# Patient Record
Sex: FEMALE | Race: WHITE | NOT HISPANIC OR LATINO | Employment: UNEMPLOYED
[De-identification: names, ages, dates, MRNs, and addresses within clinical notes are randomized per-mention and may not be internally consistent; named-entity substitution may affect disease eponyms.]

---
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
## 2023-06-04 ENCOUNTER — APPOINTMENT (OUTPATIENT)
Dept: GENERAL RADIOLOGY | Facility: HOSPITAL | Age: 23
End: 2023-06-04
Attending: EMERGENCY MEDICINE
Payer: MEDICAID

## 2023-06-04 ENCOUNTER — APPOINTMENT (OUTPATIENT)
Dept: CT IMAGING | Facility: HOSPITAL | Age: 23
End: 2023-06-04
Attending: EMERGENCY MEDICINE
Payer: MEDICAID

## 2023-06-04 ENCOUNTER — APPOINTMENT (OUTPATIENT)
Dept: ULTRASOUND IMAGING | Facility: HOSPITAL | Age: 23
End: 2023-06-04
Attending: EMERGENCY MEDICINE
Payer: MEDICAID

## 2023-06-04 ENCOUNTER — HOSPITAL ENCOUNTER (INPATIENT)
Facility: HOSPITAL | Age: 23
LOS: 2 days | Discharge: HOME | End: 2023-06-06
Attending: EMERGENCY MEDICINE | Admitting: STUDENT IN AN ORGANIZED HEALTH CARE EDUCATION/TRAINING PROGRAM
Payer: MEDICAID

## 2023-06-04 DIAGNOSIS — K81.0 ACUTE CHOLECYSTITIS: Primary | ICD-10-CM

## 2023-06-04 LAB
ALBUMIN SERPL-MCNC: 4.7 G/DL (ref 3.4–5)
ALBUMIN/GLOB SERPL: 1.3 {RATIO} (ref 1–2)
ALP LIVER SERPL-CCNC: 41 U/L
ALT SERPL-CCNC: 15 U/L
ANION GAP SERPL CALC-SCNC: 7 MMOL/L (ref 0–18)
AST SERPL-CCNC: 47 U/L (ref 15–37)
ATRIAL RATE: 85 BPM
B-HCG UR QL: NEGATIVE
BASOPHILS # BLD AUTO: 0.03 X10(3) UL (ref 0–0.2)
BASOPHILS NFR BLD AUTO: 0.3 %
BILIRUB SERPL-MCNC: 0.4 MG/DL (ref 0.1–2)
BILIRUB UR QL STRIP.AUTO: NEGATIVE
BUN BLD-MCNC: 11 MG/DL (ref 7–18)
CALCIUM BLD-MCNC: 10 MG/DL (ref 8.5–10.1)
CHLORIDE SERPL-SCNC: 106 MMOL/L (ref 98–112)
CLARITY UR REFRACT.AUTO: CLEAR
CO2 SERPL-SCNC: 24 MMOL/L (ref 21–32)
COLOR UR AUTO: YELLOW
CREAT BLD-MCNC: 0.86 MG/DL
D DIMER PPP FEU-MCNC: <0.27 UG/ML FEU (ref ?–0.5)
EOSINOPHIL # BLD AUTO: 0.05 X10(3) UL (ref 0–0.7)
EOSINOPHIL NFR BLD AUTO: 0.4 %
ERYTHROCYTE [DISTWIDTH] IN BLOOD BY AUTOMATED COUNT: 12.5 %
GFR SERPLBLD BASED ON 1.73 SQ M-ARVRAT: 99 ML/MIN/1.73M2 (ref 60–?)
GLOBULIN PLAS-MCNC: 3.6 G/DL (ref 2.8–4.4)
GLUCOSE BLD-MCNC: 118 MG/DL (ref 70–99)
GLUCOSE UR STRIP.AUTO-MCNC: NEGATIVE MG/DL
HCT VFR BLD AUTO: 36.8 %
HGB BLD-MCNC: 12.8 G/DL
IMM GRANULOCYTES # BLD AUTO: 0.05 X10(3) UL (ref 0–1)
IMM GRANULOCYTES NFR BLD: 0.4 %
KETONES UR STRIP.AUTO-MCNC: 20 MG/DL
LIPASE SERPL-CCNC: 48 U/L (ref 13–75)
LYMPHOCYTES # BLD AUTO: 1.14 X10(3) UL (ref 1–4)
LYMPHOCYTES NFR BLD AUTO: 9.6 %
MCH RBC QN AUTO: 29.7 PG (ref 26–34)
MCHC RBC AUTO-ENTMCNC: 34.8 G/DL (ref 31–37)
MCV RBC AUTO: 85.4 FL
MONOCYTES # BLD AUTO: 0.42 X10(3) UL (ref 0.1–1)
MONOCYTES NFR BLD AUTO: 3.5 %
NEUTROPHILS # BLD AUTO: 10.17 X10 (3) UL (ref 1.5–7.7)
NEUTROPHILS # BLD AUTO: 10.17 X10(3) UL (ref 1.5–7.7)
NEUTROPHILS NFR BLD AUTO: 85.8 %
NITRITE UR QL STRIP.AUTO: NEGATIVE
OSMOLALITY SERPL CALC.SUM OF ELEC: 284 MOSM/KG (ref 275–295)
P AXIS: 66 DEGREES
P-R INTERVAL: 122 MS
PH UR STRIP.AUTO: 7 [PH] (ref 5–8)
PLATELET # BLD AUTO: 267 10(3)UL (ref 150–450)
POTASSIUM SERPL-SCNC: 3.4 MMOL/L (ref 3.5–5.1)
PROT SERPL-MCNC: 8.3 G/DL (ref 6.4–8.2)
PROT UR STRIP.AUTO-MCNC: NEGATIVE MG/DL
Q-T INTERVAL: 356 MS
QRS DURATION: 92 MS
QTC CALCULATION (BEZET): 423 MS
R AXIS: 71 DEGREES
RBC # BLD AUTO: 4.31 X10(6)UL
SODIUM SERPL-SCNC: 137 MMOL/L (ref 136–145)
SP GR UR STRIP.AUTO: 1.01 (ref 1–1.03)
T AXIS: 18 DEGREES
TROPONIN I HIGH SENSITIVITY: <3 NG/L
UROBILINOGEN UR STRIP.AUTO-MCNC: <2 MG/DL
VENTRICULAR RATE: 85 BPM
WBC # BLD AUTO: 11.9 X10(3) UL (ref 4–11)

## 2023-06-04 PROCEDURE — 74177 CT ABD & PELVIS W/CONTRAST: CPT | Performed by: EMERGENCY MEDICINE

## 2023-06-04 PROCEDURE — 71045 X-RAY EXAM CHEST 1 VIEW: CPT | Performed by: EMERGENCY MEDICINE

## 2023-06-04 PROCEDURE — 76700 US EXAM ABDOM COMPLETE: CPT | Performed by: EMERGENCY MEDICINE

## 2023-06-04 PROCEDURE — 99222 1ST HOSP IP/OBS MODERATE 55: CPT | Performed by: STUDENT IN AN ORGANIZED HEALTH CARE EDUCATION/TRAINING PROGRAM

## 2023-06-04 RX ORDER — DIPHENHYDRAMINE HYDROCHLORIDE 50 MG/ML
12.5 INJECTION INTRAMUSCULAR; INTRAVENOUS EVERY 4 HOURS PRN
Status: DISCONTINUED | OUTPATIENT
Start: 2023-06-04 | End: 2023-06-06

## 2023-06-04 RX ORDER — KETOROLAC TROMETHAMINE 15 MG/ML
15 INJECTION, SOLUTION INTRAMUSCULAR; INTRAVENOUS ONCE
Status: COMPLETED | OUTPATIENT
Start: 2023-06-04 | End: 2023-06-04

## 2023-06-04 RX ORDER — HYDROCODONE BITARTRATE AND ACETAMINOPHEN 5; 325 MG/1; MG/1
1 TABLET ORAL EVERY 4 HOURS PRN
Status: DISCONTINUED | OUTPATIENT
Start: 2023-06-04 | End: 2023-06-06

## 2023-06-04 RX ORDER — SODIUM CHLORIDE 9 MG/ML
100 INJECTION, SOLUTION INTRAVENOUS CONTINUOUS
Status: DISCONTINUED | OUTPATIENT
Start: 2023-06-04 | End: 2023-06-06

## 2023-06-04 RX ORDER — SODIUM CHLORIDE, SODIUM LACTATE, POTASSIUM CHLORIDE, CALCIUM CHLORIDE 600; 310; 30; 20 MG/100ML; MG/100ML; MG/100ML; MG/100ML
INJECTION, SOLUTION INTRAVENOUS CONTINUOUS
Status: DISCONTINUED | OUTPATIENT
Start: 2023-06-04 | End: 2023-06-05

## 2023-06-04 RX ORDER — POLYETHYLENE GLYCOL 3350 17 G/17G
17 POWDER, FOR SOLUTION ORAL DAILY PRN
Status: DISCONTINUED | OUTPATIENT
Start: 2023-06-04 | End: 2023-06-06

## 2023-06-04 RX ORDER — ENEMA 19; 7 G/133ML; G/133ML
1 ENEMA RECTAL ONCE AS NEEDED
Status: DISCONTINUED | OUTPATIENT
Start: 2023-06-04 | End: 2023-06-06

## 2023-06-04 RX ORDER — SENNOSIDES 8.6 MG
17.2 TABLET ORAL NIGHTLY PRN
Status: DISCONTINUED | OUTPATIENT
Start: 2023-06-04 | End: 2023-06-06

## 2023-06-04 RX ORDER — HYDROMORPHONE HYDROCHLORIDE 1 MG/ML
0.4 INJECTION, SOLUTION INTRAMUSCULAR; INTRAVENOUS; SUBCUTANEOUS EVERY 2 HOUR PRN
Status: DISCONTINUED | OUTPATIENT
Start: 2023-06-04 | End: 2023-06-06

## 2023-06-04 RX ORDER — ENOXAPARIN SODIUM 100 MG/ML
40 INJECTION SUBCUTANEOUS NIGHTLY
Status: DISCONTINUED | OUTPATIENT
Start: 2023-06-04 | End: 2023-06-06

## 2023-06-04 RX ORDER — PROCHLORPERAZINE EDISYLATE 5 MG/ML
5 INJECTION INTRAMUSCULAR; INTRAVENOUS EVERY 8 HOURS PRN
Status: DISCONTINUED | OUTPATIENT
Start: 2023-06-04 | End: 2023-06-06

## 2023-06-04 RX ORDER — HYDROMORPHONE HYDROCHLORIDE 1 MG/ML
0.2 INJECTION, SOLUTION INTRAMUSCULAR; INTRAVENOUS; SUBCUTANEOUS EVERY 2 HOUR PRN
Status: DISCONTINUED | OUTPATIENT
Start: 2023-06-04 | End: 2023-06-06

## 2023-06-04 RX ORDER — KETOROLAC TROMETHAMINE 15 MG/ML
15 INJECTION, SOLUTION INTRAMUSCULAR; INTRAVENOUS EVERY 6 HOURS PRN
Status: DISCONTINUED | OUTPATIENT
Start: 2023-06-04 | End: 2023-06-06

## 2023-06-04 RX ORDER — IBUPROFEN 200 MG
400 TABLET ORAL AS NEEDED
COMMUNITY

## 2023-06-04 RX ORDER — ACETAMINOPHEN 325 MG/1
650 TABLET ORAL EVERY 4 HOURS PRN
Status: DISCONTINUED | OUTPATIENT
Start: 2023-06-04 | End: 2023-06-06

## 2023-06-04 RX ORDER — ACETAMINOPHEN 10 MG/ML
1000 INJECTION, SOLUTION INTRAVENOUS EVERY 6 HOURS PRN
Status: DISCONTINUED | OUTPATIENT
Start: 2023-06-04 | End: 2023-06-06

## 2023-06-04 RX ORDER — HYDROCODONE BITARTRATE AND ACETAMINOPHEN 5; 325 MG/1; MG/1
2 TABLET ORAL EVERY 4 HOURS PRN
Status: DISCONTINUED | OUTPATIENT
Start: 2023-06-04 | End: 2023-06-06

## 2023-06-04 RX ORDER — BISACODYL 10 MG
10 SUPPOSITORY, RECTAL RECTAL
Status: DISCONTINUED | OUTPATIENT
Start: 2023-06-04 | End: 2023-06-06

## 2023-06-04 RX ORDER — DIPHENHYDRAMINE HCL 25 MG
25 CAPSULE ORAL EVERY 4 HOURS PRN
Status: DISCONTINUED | OUTPATIENT
Start: 2023-06-04 | End: 2023-06-06

## 2023-06-04 RX ORDER — HYDROMORPHONE HYDROCHLORIDE 1 MG/ML
0.8 INJECTION, SOLUTION INTRAMUSCULAR; INTRAVENOUS; SUBCUTANEOUS EVERY 2 HOUR PRN
Status: DISCONTINUED | OUTPATIENT
Start: 2023-06-04 | End: 2023-06-06

## 2023-06-04 RX ORDER — ONDANSETRON 2 MG/ML
4 INJECTION INTRAMUSCULAR; INTRAVENOUS EVERY 6 HOURS PRN
Status: DISCONTINUED | OUTPATIENT
Start: 2023-06-04 | End: 2023-06-06

## 2023-06-04 RX ORDER — SODIUM CHLORIDE 9 MG/ML
INJECTION, SOLUTION INTRAVENOUS CONTINUOUS
Status: ACTIVE | OUTPATIENT
Start: 2023-06-04 | End: 2023-06-04

## 2023-06-04 NOTE — ED INITIAL ASSESSMENT (HPI)
Pt here due to dizziness and light headed. Pt states that she is having chest pain and back pain for the past couple weeks. Pt states that this comes and goes.

## 2023-06-04 NOTE — PROGRESS NOTES
NURSING ADMISSION NOTE      Patient admitted via Cart  Oriented to room. Safety precautions initiated. Bed in low position. Call light in reach. A&O x4. Denies any BRITNEY or calf pain at present. Lungs clear bilaterally. Pt reports burning chest pain 3/10, denies any need for pain medication at this time. Abdomen soft, tender, nondistended. Bowel sounds active, pt denies any nausea at this time. NPO. DTV. POC discussed and pt verbalizes understanding. Pt resting in bed, call light within reach, safety precautions in place.

## 2023-06-05 ENCOUNTER — ANESTHESIA (OUTPATIENT)
Dept: SURGERY | Facility: HOSPITAL | Age: 23
End: 2023-06-05
Payer: MEDICAID

## 2023-06-05 ENCOUNTER — APPOINTMENT (OUTPATIENT)
Dept: GENERAL RADIOLOGY | Facility: HOSPITAL | Age: 23
End: 2023-06-05
Attending: STUDENT IN AN ORGANIZED HEALTH CARE EDUCATION/TRAINING PROGRAM
Payer: MEDICAID

## 2023-06-05 ENCOUNTER — ANESTHESIA EVENT (OUTPATIENT)
Dept: SURGERY | Facility: HOSPITAL | Age: 23
End: 2023-06-05
Payer: MEDICAID

## 2023-06-05 LAB
ALBUMIN SERPL-MCNC: 3.4 G/DL (ref 3.4–5)
ALBUMIN/GLOB SERPL: 1.2 {RATIO} (ref 1–2)
ALP LIVER SERPL-CCNC: 28 U/L
ALT SERPL-CCNC: 12 U/L
ANION GAP SERPL CALC-SCNC: 1 MMOL/L (ref 0–18)
AST SERPL-CCNC: 36 U/L (ref 15–37)
BILIRUB SERPL-MCNC: 0.6 MG/DL (ref 0.1–2)
BUN BLD-MCNC: 8 MG/DL (ref 7–18)
CALCIUM BLD-MCNC: 8.8 MG/DL (ref 8.5–10.1)
CHLORIDE SERPL-SCNC: 111 MMOL/L (ref 98–112)
CO2 SERPL-SCNC: 27 MMOL/L (ref 21–32)
CREAT BLD-MCNC: 0.6 MG/DL
ERYTHROCYTE [DISTWIDTH] IN BLOOD BY AUTOMATED COUNT: 12.9 %
GFR SERPLBLD BASED ON 1.73 SQ M-ARVRAT: 131 ML/MIN/1.73M2 (ref 60–?)
GLOBULIN PLAS-MCNC: 2.8 G/DL (ref 2.8–4.4)
GLUCOSE BLD-MCNC: 89 MG/DL (ref 70–99)
HCT VFR BLD AUTO: 32.1 %
HGB BLD-MCNC: 10.6 G/DL
MCH RBC QN AUTO: 29.9 PG (ref 26–34)
MCHC RBC AUTO-ENTMCNC: 33 G/DL (ref 31–37)
MCV RBC AUTO: 90.7 FL
OSMOLALITY SERPL CALC.SUM OF ELEC: 286 MOSM/KG (ref 275–295)
PLATELET # BLD AUTO: 221 10(3)UL (ref 150–450)
POTASSIUM SERPL-SCNC: 3.7 MMOL/L (ref 3.5–5.1)
PROT SERPL-MCNC: 6.2 G/DL (ref 6.4–8.2)
RBC # BLD AUTO: 3.54 X10(6)UL
SODIUM SERPL-SCNC: 139 MMOL/L (ref 136–145)
WBC # BLD AUTO: 6.1 X10(3) UL (ref 4–11)

## 2023-06-05 PROCEDURE — 47563 LAPARO CHOLECYSTECTOMY/GRAPH: CPT | Performed by: STUDENT IN AN ORGANIZED HEALTH CARE EDUCATION/TRAINING PROGRAM

## 2023-06-05 PROCEDURE — 74300 X-RAY BILE DUCTS/PANCREAS: CPT | Performed by: STUDENT IN AN ORGANIZED HEALTH CARE EDUCATION/TRAINING PROGRAM

## 2023-06-05 PROCEDURE — 0FT44ZZ RESECTION OF GALLBLADDER, PERCUTANEOUS ENDOSCOPIC APPROACH: ICD-10-PCS | Performed by: STUDENT IN AN ORGANIZED HEALTH CARE EDUCATION/TRAINING PROGRAM

## 2023-06-05 PROCEDURE — BF101ZZ FLUOROSCOPY OF BILE DUCTS USING LOW OSMOLAR CONTRAST: ICD-10-PCS | Performed by: STUDENT IN AN ORGANIZED HEALTH CARE EDUCATION/TRAINING PROGRAM

## 2023-06-05 RX ORDER — SODIUM CHLORIDE, SODIUM LACTATE, POTASSIUM CHLORIDE, CALCIUM CHLORIDE 600; 310; 30; 20 MG/100ML; MG/100ML; MG/100ML; MG/100ML
INJECTION, SOLUTION INTRAVENOUS CONTINUOUS
Status: DISCONTINUED | OUTPATIENT
Start: 2023-06-05 | End: 2023-06-05 | Stop reason: HOSPADM

## 2023-06-05 RX ORDER — GLYCOPYRROLATE 0.2 MG/ML
INJECTION, SOLUTION INTRAMUSCULAR; INTRAVENOUS AS NEEDED
Status: DISCONTINUED | OUTPATIENT
Start: 2023-06-05 | End: 2023-06-05 | Stop reason: SURG

## 2023-06-05 RX ORDER — DEXAMETHASONE SODIUM PHOSPHATE 4 MG/ML
VIAL (ML) INJECTION AS NEEDED
Status: DISCONTINUED | OUTPATIENT
Start: 2023-06-05 | End: 2023-06-05 | Stop reason: SURG

## 2023-06-05 RX ORDER — PHENYLEPHRINE HCL 10 MG/ML
VIAL (ML) INJECTION AS NEEDED
Status: DISCONTINUED | OUTPATIENT
Start: 2023-06-05 | End: 2023-06-05 | Stop reason: SURG

## 2023-06-05 RX ORDER — SODIUM CHLORIDE, SODIUM LACTATE, POTASSIUM CHLORIDE, CALCIUM CHLORIDE 600; 310; 30; 20 MG/100ML; MG/100ML; MG/100ML; MG/100ML
INJECTION, SOLUTION INTRAVENOUS CONTINUOUS PRN
Status: DISCONTINUED | OUTPATIENT
Start: 2023-06-05 | End: 2023-06-05 | Stop reason: SURG

## 2023-06-05 RX ORDER — HYDROCODONE BITARTRATE AND ACETAMINOPHEN 5; 325 MG/1; MG/1
1 TABLET ORAL ONCE AS NEEDED
Status: DISCONTINUED | OUTPATIENT
Start: 2023-06-05 | End: 2023-06-05 | Stop reason: HOSPADM

## 2023-06-05 RX ORDER — HYDROMORPHONE HYDROCHLORIDE 1 MG/ML
0.6 INJECTION, SOLUTION INTRAMUSCULAR; INTRAVENOUS; SUBCUTANEOUS EVERY 5 MIN PRN
Status: DISCONTINUED | OUTPATIENT
Start: 2023-06-05 | End: 2023-06-05 | Stop reason: HOSPADM

## 2023-06-05 RX ORDER — MIDAZOLAM HYDROCHLORIDE 1 MG/ML
INJECTION INTRAMUSCULAR; INTRAVENOUS AS NEEDED
Status: DISCONTINUED | OUTPATIENT
Start: 2023-06-05 | End: 2023-06-05 | Stop reason: SURG

## 2023-06-05 RX ORDER — NEOSTIGMINE METHYLSULFATE 1 MG/ML
INJECTION, SOLUTION INTRAVENOUS AS NEEDED
Status: DISCONTINUED | OUTPATIENT
Start: 2023-06-05 | End: 2023-06-05 | Stop reason: SURG

## 2023-06-05 RX ORDER — KETOROLAC TROMETHAMINE 30 MG/ML
INJECTION, SOLUTION INTRAMUSCULAR; INTRAVENOUS AS NEEDED
Status: DISCONTINUED | OUTPATIENT
Start: 2023-06-05 | End: 2023-06-05 | Stop reason: SURG

## 2023-06-05 RX ORDER — ACETAMINOPHEN 500 MG
1000 TABLET ORAL ONCE AS NEEDED
Status: DISCONTINUED | OUTPATIENT
Start: 2023-06-05 | End: 2023-06-05 | Stop reason: HOSPADM

## 2023-06-05 RX ORDER — METOCLOPRAMIDE HYDROCHLORIDE 5 MG/ML
INJECTION INTRAMUSCULAR; INTRAVENOUS AS NEEDED
Status: DISCONTINUED | OUTPATIENT
Start: 2023-06-05 | End: 2023-06-05 | Stop reason: SURG

## 2023-06-05 RX ORDER — NALOXONE HYDROCHLORIDE 0.4 MG/ML
80 INJECTION, SOLUTION INTRAMUSCULAR; INTRAVENOUS; SUBCUTANEOUS AS NEEDED
Status: DISCONTINUED | OUTPATIENT
Start: 2023-06-05 | End: 2023-06-05 | Stop reason: HOSPADM

## 2023-06-05 RX ORDER — PROCHLORPERAZINE EDISYLATE 5 MG/ML
5 INJECTION INTRAMUSCULAR; INTRAVENOUS EVERY 8 HOURS PRN
Status: DISCONTINUED | OUTPATIENT
Start: 2023-06-05 | End: 2023-06-05 | Stop reason: HOSPADM

## 2023-06-05 RX ORDER — HYDROMORPHONE HYDROCHLORIDE 1 MG/ML
0.2 INJECTION, SOLUTION INTRAMUSCULAR; INTRAVENOUS; SUBCUTANEOUS EVERY 5 MIN PRN
Status: DISCONTINUED | OUTPATIENT
Start: 2023-06-05 | End: 2023-06-05 | Stop reason: HOSPADM

## 2023-06-05 RX ORDER — ONDANSETRON 2 MG/ML
INJECTION INTRAMUSCULAR; INTRAVENOUS AS NEEDED
Status: DISCONTINUED | OUTPATIENT
Start: 2023-06-05 | End: 2023-06-05 | Stop reason: SURG

## 2023-06-05 RX ORDER — ROCURONIUM BROMIDE 10 MG/ML
INJECTION, SOLUTION INTRAVENOUS AS NEEDED
Status: DISCONTINUED | OUTPATIENT
Start: 2023-06-05 | End: 2023-06-05 | Stop reason: SURG

## 2023-06-05 RX ORDER — MIDAZOLAM HYDROCHLORIDE 1 MG/ML
1 INJECTION INTRAMUSCULAR; INTRAVENOUS EVERY 5 MIN PRN
Status: DISCONTINUED | OUTPATIENT
Start: 2023-06-05 | End: 2023-06-05 | Stop reason: HOSPADM

## 2023-06-05 RX ORDER — HYDROCODONE BITARTRATE AND ACETAMINOPHEN 5; 325 MG/1; MG/1
2 TABLET ORAL ONCE AS NEEDED
Status: DISCONTINUED | OUTPATIENT
Start: 2023-06-05 | End: 2023-06-05 | Stop reason: HOSPADM

## 2023-06-05 RX ORDER — BUPIVACAINE HYDROCHLORIDE 2.5 MG/ML
INJECTION, SOLUTION EPIDURAL; INFILTRATION; INTRACAUDAL AS NEEDED
Status: DISCONTINUED | OUTPATIENT
Start: 2023-06-05 | End: 2023-06-05

## 2023-06-05 RX ORDER — ONDANSETRON 2 MG/ML
4 INJECTION INTRAMUSCULAR; INTRAVENOUS EVERY 6 HOURS PRN
Status: DISCONTINUED | OUTPATIENT
Start: 2023-06-05 | End: 2023-06-05 | Stop reason: HOSPADM

## 2023-06-05 RX ORDER — HYDROMORPHONE HYDROCHLORIDE 1 MG/ML
0.4 INJECTION, SOLUTION INTRAMUSCULAR; INTRAVENOUS; SUBCUTANEOUS EVERY 5 MIN PRN
Status: DISCONTINUED | OUTPATIENT
Start: 2023-06-05 | End: 2023-06-05 | Stop reason: HOSPADM

## 2023-06-05 RX ORDER — MIDAZOLAM HYDROCHLORIDE 1 MG/ML
INJECTION INTRAMUSCULAR; INTRAVENOUS
Status: COMPLETED
Start: 2023-06-05 | End: 2023-06-05

## 2023-06-05 RX ORDER — CEFOXITIN 1 G/1
INJECTION, POWDER, FOR SOLUTION INTRAVENOUS AS NEEDED
Status: DISCONTINUED | OUTPATIENT
Start: 2023-06-05 | End: 2023-06-05 | Stop reason: SURG

## 2023-06-05 RX ORDER — HYDROCODONE BITARTRATE AND ACETAMINOPHEN 5; 325 MG/1; MG/1
1 TABLET ORAL EVERY 6 HOURS PRN
Qty: 15 TABLET | Refills: 0 | Status: SHIPPED | OUTPATIENT
Start: 2023-06-05

## 2023-06-05 RX ORDER — LIDOCAINE HYDROCHLORIDE 10 MG/ML
INJECTION, SOLUTION EPIDURAL; INFILTRATION; INTRACAUDAL; PERINEURAL AS NEEDED
Status: DISCONTINUED | OUTPATIENT
Start: 2023-06-05 | End: 2023-06-05 | Stop reason: SURG

## 2023-06-05 RX ADMIN — METOCLOPRAMIDE HYDROCHLORIDE 10 MG: 5 INJECTION INTRAMUSCULAR; INTRAVENOUS at 21:36:00

## 2023-06-05 RX ADMIN — CEFOXITIN 2 G: 1 INJECTION, POWDER, FOR SOLUTION INTRAVENOUS at 21:33:00

## 2023-06-05 RX ADMIN — NEOSTIGMINE METHYLSULFATE 3.5 MG: 1 INJECTION, SOLUTION INTRAVENOUS at 22:45:00

## 2023-06-05 RX ADMIN — KETOROLAC TROMETHAMINE 30 MG: 30 INJECTION, SOLUTION INTRAMUSCULAR; INTRAVENOUS at 22:36:00

## 2023-06-05 RX ADMIN — SODIUM CHLORIDE, SODIUM LACTATE, POTASSIUM CHLORIDE, CALCIUM CHLORIDE: 600; 310; 30; 20 INJECTION, SOLUTION INTRAVENOUS at 22:42:00

## 2023-06-05 RX ADMIN — LIDOCAINE HYDROCHLORIDE 50 MG: 10 INJECTION, SOLUTION EPIDURAL; INFILTRATION; INTRACAUDAL; PERINEURAL at 21:17:00

## 2023-06-05 RX ADMIN — ROCURONIUM BROMIDE 50 MG: 10 INJECTION, SOLUTION INTRAVENOUS at 21:17:00

## 2023-06-05 RX ADMIN — GLYCOPYRROLATE 0.4 MG: 0.2 INJECTION, SOLUTION INTRAMUSCULAR; INTRAVENOUS at 22:45:00

## 2023-06-05 RX ADMIN — PHENYLEPHRINE HCL 100 MCG: 10 MG/ML VIAL (ML) INJECTION at 21:30:00

## 2023-06-05 RX ADMIN — PHENYLEPHRINE HCL 100 MCG: 10 MG/ML VIAL (ML) INJECTION at 22:14:00

## 2023-06-05 RX ADMIN — MIDAZOLAM HYDROCHLORIDE 2 MG: 1 INJECTION INTRAMUSCULAR; INTRAVENOUS at 21:15:00

## 2023-06-05 RX ADMIN — DEXAMETHASONE SODIUM PHOSPHATE 4 MG: 4 MG/ML VIAL (ML) INJECTION at 21:26:00

## 2023-06-05 RX ADMIN — ONDANSETRON 4 MG: 2 INJECTION INTRAMUSCULAR; INTRAVENOUS at 22:36:00

## 2023-06-05 RX ADMIN — SODIUM CHLORIDE, SODIUM LACTATE, POTASSIUM CHLORIDE, CALCIUM CHLORIDE: 600; 310; 30; 20 INJECTION, SOLUTION INTRAVENOUS at 21:13:00

## 2023-06-05 NOTE — PLAN OF CARE
A&O x4. Denies any BRITNEY, or calf pain at present. Lungs clear bilaterally. Abdomen soft, tender, nondistended. Bowel sounds active, pt denies any nausea at this time. NPO. Voiding without difficulty. IVF and abx per MAR. Pt reports burning chest pain 3/10, pain medication and heat packs given. Pt encouraged to ambulate in hallways and be up in chair today. POC discussed and pt verbalizes understanding. Pt resting in bed, call light within reach, safety precautions in place. Problem: PAIN - ADULT  Goal: Verbalizes/displays adequate comfort level or patient's stated pain goal  Description: INTERVENTIONS:  - Encourage pt to monitor pain and request assistance  - Assess pain using appropriate pain scale  - Administer analgesics based on type and severity of pain and evaluate response  - Implement non-pharmacological measures as appropriate and evaluate response  - Consider cultural and social influences on pain and pain management  - Manage/alleviate anxiety  - Utilize distraction and/or relaxation techniques  - Monitor for opioid side effects  - Notify MD/LIP if interventions unsuccessful or patient reports new pain  - Anticipate increased pain with activity and pre-medicate as appropriate  Outcome: Progressing     Problem: RISK FOR INFECTION - ADULT  Goal: Absence of fever/infection during anticipated neutropenic period  Description: INTERVENTIONS  - Monitor WBC  - Administer growth factors as ordered  - Implement neutropenic guidelines  Outcome: Progressing     Problem: SAFETY ADULT - FALL  Goal: Free from fall injury  Description: INTERVENTIONS:  - Assess pt frequently for physical needs  - Identify cognitive and physical deficits and behaviors that affect risk of falls.   - [place] fall precautions as indicated by assessment.  - Educate pt/family on patient safety including physical limitations  - Instruct pt to call for assistance with activity based on assessment  - Modify environment to reduce risk of injury  - Provide assistive devices as appropriate  - Consider OT/PT consult to assist with strengthening/mobility  - Encourage toileting schedule  Outcome: Progressing     Problem: DISCHARGE PLANNING  Goal: Discharge to home or other facility with appropriate resources  Description: INTERVENTIONS:  - Identify barriers to discharge w/pt and caregiver  - Include patient/family/discharge partner in discharge planning  - Arrange for needed discharge resources and transportation as appropriate  - Identify discharge learning needs (meds, wound care, etc)  - Arrange for interpreters to assist at discharge as needed  - Consider post-discharge preferences of patient/family/discharge partner  - Complete POLST form as appropriate  - Assess patient's ability to be responsible for managing their own health  - Refer to Case Management Department for coordinating discharge planning if the patient needs post-hospital services based on physician/LIP order or complex needs related to functional status, cognitive ability or social support system  Outcome: Progressing

## 2023-06-05 NOTE — PLAN OF CARE
Pt vitals stable, A&O x4. Pt stated having burn chest pain as she had throughout the day. Denied shortness of breath and calf pain. Abdomen tender. Denied the need for pain meds. IVF infusing. To remain NPO for possible procedure. Bed locked in low position, call light in reach, rounding provided. 2150: pt given dilaudid for pain and called minutes later stating she was itchy and warm. No temp noted. Explained that these are side effects from the medication. MD paged, orders received. Benadryl given to help with side effects.

## 2023-06-06 VITALS
HEIGHT: 65 IN | TEMPERATURE: 99 F | BODY MASS INDEX: 21.71 KG/M2 | DIASTOLIC BLOOD PRESSURE: 66 MMHG | HEART RATE: 57 BPM | WEIGHT: 130.31 LBS | OXYGEN SATURATION: 100 % | RESPIRATION RATE: 18 BRPM | SYSTOLIC BLOOD PRESSURE: 110 MMHG

## 2023-06-06 NOTE — PLAN OF CARE
Pt presented onto unit from PACU in stable condition   A&Ox4. VSS. RA. . Denies chest pain and SOB. GI: Abdomen soft, nondistended. Due to pass gas and belch. Denies nausea. : Voids. Pain controlled with PRN pain medications. Up with standby assist.   Drains: None  Incisions: x5 abdominal lap sites with derma-bond   Diet: General diet - tolerating well. IVF running per order. All appropriate safety measures in place. All questions and concerns addressed. Declan Anis

## 2023-06-06 NOTE — ANESTHESIA PROCEDURE NOTES
Airway  Date/Time: 6/5/2023 9:20 PM  Urgency: elective    Airway not difficult    General Information and Staff    Patient location during procedure: OR  Anesthesiologist: Cash Alonso MD  Performed: anesthesiologist   Performed by: Cash Alonso MD  Authorized by: Cash Alonso MD      Indications and Patient Condition  Indications for airway management: anesthesia  Spontaneous Ventilation: absent  Sedation level: deep  Preoxygenated: yes  Patient position: sniffing  Mask difficulty assessment: 1 - vent by mask    Final Airway Details  Final airway type: endotracheal airway      Successful airway: ETT  Cuffed: yes   Successful intubation technique: direct laryngoscopy  Endotracheal tube insertion site: oral  Blade: Elizabeth  Blade size: #3  ETT size (mm): 7.0    Cormack-Lehane Classification: grade I - full view of glottis  Placement verified by: chest auscultation and capnometry   Cuff volume (mL): 6  Measured from: lips  ETT to lips (cm): 21  Number of attempts at approach: 1

## 2023-06-06 NOTE — DISCHARGE SUMMARY
[facility]  Discharge Summary    Yosvany Dejesus Patient Status:  Inpatient    2000 MRN [id number]   [facility] 3NW-A Attending Kalyani Keen MD   [facility] Day # 2 PCP No primary care provider on file. Date of Admission: 2023    Date of Discharge: 2023    Admitting Diagnosis: Acute cholecystitis [K81.0]     Patient Active Problem List:     Acute cholecystitis      Reason for Admission:  Acute cholecystitis [K81.0]     Procedures: LAPAROSCOPIC CHOLECYSTECTOMY WITH CHOLANGIOGRAM    History of Present Illness: Acute cholecystitis [K81.0]  Ursula Schultz is a 63-year-old female who presented to  2023 complaints of mid to lower substernal chest pain that began suddenly overnight. Also had complaints of pain along her upper thoracic spine. A CT scan of the abdomen pelvis showed thickening of the gallbladder, distention of the gallbladder, and prominence of the biliary tree which may be related to acute cholecystitis or passing stone. Subsequent ultrasound showed thickening of the gallbladder, distention of the gallbladder, and prominence of the biliary tree which may be related to acute cholecystitis or passing stone. Upon examination patient had tenderness right upper quadrant. Patient underwent a laparoscopic cholecystectomy 23 with Dr. Marina Mata. Hospital Course: The patient tolerated the above listed procedure without complication. The patient is being discharged with the following physical exam.      Physical Exam: Abdomen soft, incisional tenderness, no rebound or guarding, no peritoneal signs. Incisions clean, dry, intact, no erythema. Rebound intact. Consultations: None    Complications: none     Disposition: Home to self care   Discharge Condition: Good    Discharge Medications: Current Discharge Medication List    START taking these medications    HYDROcodone-acetaminophen 5-325 MG Oral Tab  Take 1 tablet by mouth every 6 (six) hours as needed for Pain.   Qty: 15 tablet Refills: 0      CONTINUE these medications which have NOT CHANGED    ibuprofen 200 MG Oral Tab  Take 2 tablets (400 mg total) by mouth as needed for Pain. Follow up Visits: Follow-up with Dr. Eliane Dakins in approximately the next 10-14 days. Other Discharge Instructions:    General diet  No lifting, no driving, the patient may shower  Norco for pain  All questions answered. Patient is to follow-up with [facility] general surgery in the next 2 weeks, sooner if needed.     Brandi Bowman PA-C  6/6/2023  9:48 AM

## 2023-06-06 NOTE — PLAN OF CARE
Problem: Patient/Family Goals  Goal: Patient/Family Long Term Goal  Description: Patient's Long Term Goal: go home     Interventions:  - soft diet   - See additional Care Plan goals for specific interventions  Outcome: Adequate for Discharge  Goal: Patient/Family Short Term Goal  Description: Patient's Short Term Goal: free from pain     Interventions:   - pain medicine as needed   - See additional Care Plan goals for specific interventions  Outcome: Adequate for Discharge     Problem: PAIN - ADULT  Goal: Verbalizes/displays adequate comfort level or patient's stated pain goal  Description: INTERVENTIONS:  - Encourage pt to monitor pain and request assistance  - Assess pain using appropriate pain scale  - Administer analgesics based on type and severity of pain and evaluate response  - Implement non-pharmacological measures as appropriate and evaluate response  - Consider cultural and social influences on pain and pain management  - Manage/alleviate anxiety  - Utilize distraction and/or relaxation techniques  - Monitor for opioid side effects  - Notify MD/LIP if interventions unsuccessful or patient reports new pain  - Anticipate increased pain with activity and pre-medicate as appropriate  Outcome: Adequate for Discharge     Problem: RISK FOR INFECTION - ADULT  Goal: Absence of fever/infection during anticipated neutropenic period  Description: INTERVENTIONS  - Monitor WBC  - Administer growth factors as ordered  - Implement neutropenic guidelines  Outcome: Adequate for Discharge     Problem: SAFETY ADULT - FALL  Goal: Free from fall injury  Description: INTERVENTIONS:  - Assess pt frequently for physical needs  - Identify cognitive and physical deficits and behaviors that affect risk of falls.   - [place] fall precautions as indicated by assessment.  - Educate pt/family on patient safety including physical limitations  - Instruct pt to call for assistance with activity based on assessment  - Modify environment to reduce risk of injury  - Provide assistive devices as appropriate  - Consider OT/PT consult to assist with strengthening/mobility  - Encourage toileting schedule  Outcome: Adequate for Discharge     Problem: DISCHARGE PLANNING  Goal: Discharge to home or other facility with appropriate resources  Description: INTERVENTIONS:  - Identify barriers to discharge w/pt and caregiver  - Include patient/family/discharge partner in discharge planning  - Arrange for needed discharge resources and transportation as appropriate  - Identify discharge learning needs (meds, wound care, etc)  - Arrange for interpreters to assist at discharge as needed  - Consider post-discharge preferences of patient/family/discharge partner  - Complete POLST form as appropriate  - Assess patient's ability to be responsible for managing their own health  - Refer to Case Management Department for coordinating discharge planning if the patient needs post-hospital services based on physician/LIP order or complex needs related to functional status, cognitive ability or social support system  Outcome: Adequate for Discharge   Alert and orient x 4 , on room air , vitals stable . Abdominal incision dry and intact , voids well . Tolerated diet well . Up in room , ambulated  in sultana . Plan of care discussed , answered all questions . Verbalized understanding .  Will continue to monitor

## 2023-06-06 NOTE — PROGRESS NOTES
NURSING DISCHARGE NOTE    Discharged Home via Wheelchair. Accompanied by Family member and Support staff  Belongings Taken by patient/family. Discussed discharge instructions with patient . Answered all questions .  Verbalized understanding ,

## 2023-06-07 ENCOUNTER — PATIENT OUTREACH (OUTPATIENT)
Dept: CASE MANAGEMENT | Age: 23
End: 2023-06-07

## 2023-06-07 ENCOUNTER — TELEPHONE (OUTPATIENT)
Facility: LOCATION | Age: 23
End: 2023-06-07

## 2023-06-07 NOTE — TELEPHONE ENCOUNTER
Patient's mother reports patient that experiences \"spasms\" in the abdomen that vary from a few seconds to ten minutes in length. She had a lap sarah with Dr. Marny Kanner on 6/5. Patient's mother requests a callback at [phone] to discuss post surgery recovery.

## 2023-06-07 NOTE — TELEPHONE ENCOUNTER
S/w pt/pts mother. Spasms have been occurring when she is sitting on the right hand side of abd. She denies n/v, diarrhea/constipation. Appetite wnl. Pt states today after her pain medications, no spasms. reviwed with pt/pt mother normal post op recovery d/t nerve endings, abd muscles and healing.   They both verbalized understanding

## 2023-06-07 NOTE — PAYOR COMM NOTE
--------------  DISCHARGE REVIEW    Payor: Mitchel Cruz #:  [id number]  Authorization Number: [id number]    Admit date: 23  Admit time:   2:11 PM  Discharge Date: 2023  3:22 PM     Admitting Physician: Nica Celeste MD  Attending Physician:  No att. providers found  Primary Care Physician: No primary care provider on file. Discharge Summary Notes      Discharge Summary signed by Mick Conteh PA-C at 2023  9:53 AM     Author: Mick Conteh PA-C Specialty: Physician Assistant Surgical, SURGERY, GENERAL Author Type: Physician Assistant    Filed: 2023  9:53 AM Date of Service: 2023  9:47 AM Status: Signed    : Rina Barkley (Physician Assistant)         [facility]  Discharge Summary    [place] Patient Status:  Inpatient    2000 MRN [id number]   [facility] 3NW-A Attending Nica Celeste MD   [facility] Day # 2 PCP No primary care provider on file. Date of Admission: 2023    Date of Discharge: 2023    Admitting Diagnosis: Acute cholecystitis [K81.0]     Patient Active Problem List:     Acute cholecystitis      Reason for Admission:  Acute cholecystitis [K81.0]     Procedures: LAPAROSCOPIC CHOLECYSTECTOMY WITH CHOLANGIOGRAM    History of Present Illness: Acute cholecystitis [K81.0]  Eli Clemons is a 71-year-old female who presented to  2020 complaints of mid to lower substernal chest pain that began suddenly overnight. Also had complaints of pain along her upper thoracic spine. A CT scan of the abdomen pelvis showed thickening of the gallbladder, distention of the gallbladder, and prominence of the biliary tree which may be related to acute cholecystitis or passing stone. Subsequent ultrasound showed thickening of the gallbladder, distention of the gallbladder, and prominence of the biliary tree which may be related to acute cholecystitis or passing stone.  Upon examination patient had tenderness right upper quadrant. Patient underwent a laparoscopic cholecystectomy 23 with Dr. Rosibel Conklin. Hospital Course: The patient tolerated the above listed procedure without complication. The patient is being discharged with the following physical exam.      Physical Exam: Abdomen soft, incisional tenderness, no rebound or guarding, no peritoneal signs. Incisions clean, dry, intact, no erythema. Rebound intact. Consultations: None    Complications: none     Disposition: Home to self care   Discharge Condition: Good    Discharge Medications: Current Discharge Medication List    START taking these medications    HYDROcodone-acetaminophen 5-325 MG Oral Tab  Take 1 tablet by mouth every 6 (six) hours as needed for Pain. Qty: 15 tablet Refills: 0      CONTINUE these medications which have NOT CHANGED    ibuprofen 200 MG Oral Tab  Take 2 tablets (400 mg total) by mouth as needed for Pain. Follow up Visits: Follow-up with Dr. Rosibel Conklin in approximately the next 10-14 days. Other Discharge Instructions:    General diet  No lifting, no driving, the patient may shower  Norco for pain  All questions answered. Patient is to follow-up with [facility] general surgery in the next 2 weeks, sooner if needed.     Carmine Martini PA-C  6/6/2023  9:48 AM    Electronically signed by Christine Palacios PA-C on 6/6/2023  9:53 AM         REVIEWER COMMENTS

## 2023-06-08 NOTE — PROGRESS NOTES
LM for pt to call [facility] for condition update since discharge. [facility] phone number was provided for pt to call back. [facility] contact information was provided for pt to call back as well.

## 2023-06-12 ENCOUNTER — TELEPHONE (OUTPATIENT)
Facility: LOCATION | Age: 23
End: 2023-06-12

## 2023-06-19 ENCOUNTER — OFFICE VISIT (OUTPATIENT)
Facility: LOCATION | Age: 23
End: 2023-06-19

## 2023-06-19 VITALS — HEART RATE: 129 BPM | TEMPERATURE: 100 F

## 2023-06-19 DIAGNOSIS — K81.0 ACUTE CHOLECYSTITIS: Primary | ICD-10-CM

## 2023-06-19 PROCEDURE — 99024 POSTOP FOLLOW-UP VISIT: CPT | Performed by: STUDENT IN AN ORGANIZED HEALTH CARE EDUCATION/TRAINING PROGRAM

## 2023-06-21 NOTE — TELEPHONE ENCOUNTER
Dr. Chung Erp,     Please sign off on form if you agree to: 6/5/23-7/16/23 RTW: 7/17/23  (Please place your signature on the first page only)    -From your Inbasket, Highlight the patient and click Chart   -Double click the 8/97/86 Forms Completion telephone encounter  -Scroll down to the Media section   -Click the blue Hyperlink: Araceli Santizo [birth year]   -Click Acknowledge located at the bottom right corner (if you do not see acknowledge, try maximizing your window)   -Drag the mouse into the blank space of the document and a + sign will appear. Left click to   electronically sign the document. Thank you,    Patti Clement.

## 2023-06-22 NOTE — TELEPHONE ENCOUNTER
Pt put email on janina to send cmpleted forms to. Called pt and lvm indicating we cannot email forms and to provide with a fax number. Also indicated in message to call us if she would like her forms uploaded to her [employer] and she could submit on her own.

## (undated) DEVICE — TIGERTAIL 5F FLXTIP 70CM

## (undated) DEVICE — ZIPWIRE GUIDEWIRE 035X150 STR

## (undated) DEVICE — 40580 - THE PINK PAD - ADVANCED TRENDELENBURG POSITIONING KIT: Brand: 40580 - THE PINK PAD - ADVANCED TRENDELENBURG POSITIONING KIT

## (undated) DEVICE — LIGHT HANDLE

## (undated) DEVICE — SUT PDS II 0 CT-2 Z334H

## (undated) DEVICE — POUCH SPECIMEN WIRE 6X3 250ML

## (undated) DEVICE — DISPOSABLE LAPAROSCOPIC CLIP APPLIER WITH 20 CLIPS.: Brand: EPIX® UNIVERSAL CLIP APPLIER

## (undated) DEVICE — C-ARM: Brand: UNBRANDED

## (undated) DEVICE — STERILE POLYISOPRENE POWDER-FREE SURGICAL GLOVES: Brand: PROTEXIS

## (undated) DEVICE — LAP CHOLE/APPY CDS-LF: Brand: MEDLINE INDUSTRIES, INC.

## (undated) DEVICE — MINI ENDOCUT SCISSOR TIP, DISPOSABLE: Brand: RENEW

## (undated) DEVICE — ENDOPATH ULTRA VERESS INSUFFLATION NEEDLES WITH LUER LOCK CONNECTORS: Brand: ENDOPATH

## (undated) DEVICE — TROCAR: Brand: KII FIOS FIRST ENTRY

## (undated) DEVICE — Device

## (undated) DEVICE — TROCAR: Brand: KII® SLEEVE

## (undated) DEVICE — DERMABOND CLOSURE 0.7ML TOPICL

## (undated) DEVICE — LAPAROVUE VISIBILITY SYSTEM LAPAROSCOPIC SOLUTIONS: Brand: LAPAROVUE

## (undated) DEVICE — TRADITIONAL MARYLAND DISSECTOR TIP, DISPOSABLE: Brand: RENEW

## (undated) DEVICE — SUT MONOCRYL 4-0 PS-2 Y496G

## (undated) DEVICE — LAPCLINCH GRASPER TIP, DISPOSABLE: Brand: RENEW

## (undated) DEVICE — SHEET,DRAPE,40X58,STERILE: Brand: MEDLINE

## (undated) DEVICE — SLEEVE KENDALL SCD EXPRESS MED

## (undated) DEVICE — SOL NACL IRRIG 0.9% 1000ML BTL

## (undated) NOTE — LETTER
2023    Return to work    Name: Briseyda Lewis        : 2000    To Whom It May Concern,    Briseyda Lewis had surgery on 2023 and is:    Able to return to school / work without restrictions on 2023. If there are any further questions, regarding this patient's care, please contact the patient directly.     Sincerely,      Bipin Hamilton MD